# Patient Record
Sex: MALE | Race: WHITE | NOT HISPANIC OR LATINO | Employment: FULL TIME | URBAN - METROPOLITAN AREA
[De-identification: names, ages, dates, MRNs, and addresses within clinical notes are randomized per-mention and may not be internally consistent; named-entity substitution may affect disease eponyms.]

---

## 2021-06-03 ENCOUNTER — RECORDS - HEALTHEAST (OUTPATIENT)
Dept: ADMINISTRATIVE | Facility: CLINIC | Age: 41
End: 2021-06-03

## 2023-01-07 ENCOUNTER — HOSPITAL ENCOUNTER (EMERGENCY)
Facility: CLINIC | Age: 43
Discharge: HOME | End: 2023-01-07
Attending: EMERGENCY MEDICINE | Admitting: EMERGENCY MEDICINE

## 2023-01-07 VITALS
SYSTOLIC BLOOD PRESSURE: 184 MMHG | OXYGEN SATURATION: 98 % | RESPIRATION RATE: 18 BRPM | HEIGHT: 71 IN | TEMPERATURE: 97 F | DIASTOLIC BLOOD PRESSURE: 118 MMHG | BODY MASS INDEX: 33.82 KG/M2 | WEIGHT: 241.6 LBS | HEART RATE: 66 BPM

## 2023-01-07 DIAGNOSIS — K02.9 DENTAL DECAY: ICD-10-CM

## 2023-01-07 DIAGNOSIS — K02.9 DENTAL CAVITY: ICD-10-CM

## 2023-01-07 PROCEDURE — 99284 EMERGENCY DEPT VISIT MOD MDM: CPT | Mod: 25

## 2023-01-07 PROCEDURE — 64400 NJX AA&/STRD TRIGEMINAL NRV: CPT

## 2023-01-07 RX ORDER — IBUPROFEN 800 MG/1
800 TABLET, FILM COATED ORAL EVERY 8 HOURS PRN
Qty: 30 TABLET | Refills: 0 | Status: SHIPPED | OUTPATIENT
Start: 2023-01-07 | End: 2023-01-17

## 2023-01-07 RX ORDER — IBUPROFEN 400 MG/1
800 TABLET, FILM COATED ORAL ONCE
Status: COMPLETED | OUTPATIENT
Start: 2023-01-07 | End: 2023-01-07

## 2023-01-07 RX ORDER — PENICILLIN V POTASSIUM 500 MG/1
500 TABLET, FILM COATED ORAL 4 TIMES DAILY
Qty: 28 TABLET | Refills: 0 | Status: SHIPPED | OUTPATIENT
Start: 2023-01-07 | End: 2023-01-14

## 2023-01-07 ASSESSMENT — ACTIVITIES OF DAILY LIVING (ADL): ADLS_ACUITY_SCORE: 33

## 2023-01-07 NOTE — ED PROVIDER NOTES
Emergency Department Encounter     Evaluation Date & Time:   2023  5:02 AM    CHIEF COMPLAINT:  Dental Pain      Triage Note:  Here for dental pain on the left lower side, patient's tooth broke yesterday  Ibuprofen and tylenol PTA      Triage Assessment     Row Name 23 0500       Triage Assessment (Adult)    Airway WDL WDL       Respiratory WDL    Respiratory WDL WDL       Skin Circulation/Temperature WDL    Skin Circulation/Temperature WDL WDL       Cardiac WDL    Cardiac WDL WDL       Peripheral/Neurovascular WDL    Peripheral Neurovascular WDL WDL       Cognitive/Neuro/Behavioral WDL    Cognitive/Neuro/Behavioral WDL WDL                  FINAL IMPRESSION:    ICD-10-CM    1. Dental decay  K02.9       2. Dental cavity  K02.9           Impression and Plan     ED COURSE & MEDICAL DECISION MAKIN:14 AM I met with the patient for an interview and initial exam. Plans for care were discussed.  ED Course as of 23 0554   Sat 2023   0550 Isidro had a filling that fell out on his premolar.  It is quite tender to the touch and he does continue to drink water here to help with the symptoms.  There is no evidence for surrounding abscess or infection, but obviously will need to be on penicillin to prevent infection getting into that right now that it is freshly exposed.  No trauma to suggest other injury to the jaw.  I did discuss with him options for pain control including covering with a temporary filling that we have here but he has already tried that at home and if anything it seemed to make it worse.  So, I then discussed with him doing a nerve block.  We did do an inferior alveolar nerve block with limited relief so I did do an additional injection at the root of the tooth.  A total of 3 cc of 0.5% bupivacaine without epinephrine was used between these 2 different locations of injection.  He did tolerate the procedure quite well.  Was additionally given ibuprofen for pain and he does have a  plan in place to follow-up with [facility] later today for their walk-in clinic.  His blood pressure was elevated today but he is in acute discomfort and he does have underlying high blood pressure.  No signs or symptoms of systemic illness related to the high blood pressure today.       At the conclusion of the encounter I discussed the results of all the tests and the disposition. The questions were answered. The patient or family acknowledged understanding and was agreeable with the care plan.        Medical Decision Making    History:    Supplemental history from: Documented in HPI, if applicable    External Record(s) reviewed: Documented in HPI, if applicable.    Work Up:    Chart documentation includes differential considered and any EKGs or imaging independently interpreted by provider.    In additional to work up documented, I considered the following work up: See chart documentation, if applicable.    External consultation:    Discussion of management with another provider: See chart documentation, if applicable    Complicating factors:    Care impacted by chronic illness: N/A    Care affected by social determinants of health: N/A    Disposition considerations: Discharge. I recommended the patient continue their current prescription strength medication(s): for blood pressure. N/A.        0 minutes of critical care time        MEDICATIONS GIVEN IN THE EMERGENCY DEPARTMENT:  Medications   ibuprofen (ADVIL/MOTRIN) tablet 800 mg (has no administration in time range)       NEW PRESCRIPTIONS STARTED AT TODAY'S ED VISIT:  New Prescriptions    IBUPROFEN (ADVIL/MOTRIN) 800 MG TABLET    Take 1 tablet (800 mg) by mouth every 8 hours as needed for moderate pain (4-6)    PENICILLIN V (VEETID) 500 MG TABLET    Take 1 tablet (500 mg) by mouth 4 times daily for 7 days       HPI     HPI     Isidro Zavaleta is a 42 year old male with a no pertinent history who presents to this ED by private vehicle for  "evaluation of dental pain.    Yesterday, the patient was chewing on something and broke the filling of his bottom molar. Today, he woke up from his sleep and noticed more of his filling came out and he is experiencing throbbing pain to his left lower jaw. He has been drinking cold water to alleviate the pain. He is currently taking blood pressure medication. Otherwise, he seems healthy and denies any other associated symptoms.    REVIEW OF SYSTEMS:  Review of Systems   HENT: Positive for dental problem (broken filling).         Positive for left lower jaw pain   All other systems reviewed and are negative.    remainder of systems are all otherwise negative.        Medical History     No past medical history on file.  Patient notes a hx/o htn for which he takes medication, and hx/o dental disease with extracted teeth previously    No past surgical history on file.    No family history on file.         ibuprofen (ADVIL/MOTRIN) 800 MG tablet  penicillin V (VEETID) 500 MG tablet        Physical Exam     First Vitals:  Patient Vitals for the past 24 hrs:   BP Temp Temp src Pulse Resp SpO2 Height Weight   01/07/23 0458 (!) 184/118 97  F (36.1  C) Temporal 66 18 98 % 1.803 m (5' 11\") 109.6 kg (241 lb 9.6 oz)       PHYSICAL EXAM:   Constitutional:   Sitting up, appears uncomfortable  HENT:  Normocephalic, posterior pharynx wnl, mucous membranes moist and dark pink. Left lower premolar partially decay, no surrounding erythema. No trismus   Eyes:  PERRL, EOMI, Conjunctiva normal, No discharge, no scleral icterus.  Respiratory:  Breathing easily, clear auscultation  Cardiovascular:  RRR nl s1s2 0 murmurs, rubs, or gallops.  Peripheral pulses dp, pt, and radial are wnl.  No peripheral edema   Musculoskeletal:  Moves all extremities.  No erythematous or swollen major joints,   Integument:  Normal skin  Lymphatic:  No cervical lymphadenopathy  Neurologic:  Alert & oriented x 3, Normal motor function, Normal sensory function, No " focal deficits noted. Normal speech.  Psychiatric:  Affect normal, Judgment normal, Mood normal.     Results     LAB AND RADIOLOGY:  All pertinent labs reviewed and interpreted  No results found for any visits on 01/07/23.             The creation of this record is based on the scribe s observations of the work being performed by Sohan Strange and the provider s statements to them. This document has been checked and approved by MD Kitty Pink MD  Emergency Medicine  [facility] EMERGENCY ROOM         Kitty Lucas MD  01/07/23 0572

## 2023-01-07 NOTE — DISCHARGE INSTRUCTIONS
Continue to use your temporary cap material to cover it as much as possible as that does typically help with the pain.    Use the penicillin to prevent infection getting into the root that is now exposed.

## 2023-01-07 NOTE — Clinical Note
Isidro Zavaleta was seen and treated in our emergency department on 1/7/2023.         Sincerely,     [facility] Emergency Room

## 2023-01-07 NOTE — Clinical Note
Isidro Zavaleta was seen and treated in our emergency department on 1/7/2023.         Sincerely,     [facility] Emergency Room

## 2023-01-07 NOTE — ED NOTES
Pain to left lower tooth after reportedly breaking on, 01/05/2023. States that he usually goes to [facility] but is unable to get in until Monday. Pain is improved with cold water on tooth. Anticipate discharge. Continue to assist as needed. Call light appropriate.

## 2023-01-07 NOTE — Clinical Note
Isidro Zavaleta was seen and treated in our emergency department on 1/7/2023.         Sincerely,     [facility] Emergency Room

## 2023-01-07 NOTE — ED TRIAGE NOTES
Here for dental pain on the left lower side, patient's tooth broke yesterday  Ibuprofen and tylenol PTA      Triage Assessment     Row Name 01/07/23 0500       Triage Assessment (Adult)    Airway WDL WDL       Respiratory WDL    Respiratory WDL WDL       Skin Circulation/Temperature WDL    Skin Circulation/Temperature WDL WDL       Cardiac WDL    Cardiac WDL WDL       Peripheral/Neurovascular WDL    Peripheral Neurovascular WDL WDL       Cognitive/Neuro/Behavioral WDL    Cognitive/Neuro/Behavioral WDL WDL